# Patient Record
Sex: FEMALE | Race: WHITE | Employment: FULL TIME | URBAN - NONMETROPOLITAN AREA
[De-identification: names, ages, dates, MRNs, and addresses within clinical notes are randomized per-mention and may not be internally consistent; named-entity substitution may affect disease eponyms.]

---

## 2017-11-05 ENCOUNTER — HOSPITAL ENCOUNTER (OUTPATIENT)
Age: 47
Setting detail: OBSERVATION
Discharge: HOME | End: 2017-11-06
Attending: EMERGENCY MEDICINE | Admitting: FAMILY MEDICINE
Payer: MEDICAID

## 2017-11-05 DIAGNOSIS — E03.9 ACQUIRED HYPOTHYROIDISM: ICD-10-CM

## 2017-11-05 DIAGNOSIS — E86.0 DEHYDRATION: ICD-10-CM

## 2017-11-05 DIAGNOSIS — E03.2 HYPOTHYROIDISM DUE TO MEDICATION: Primary | ICD-10-CM

## 2017-11-05 DIAGNOSIS — R07.9 CHEST PAIN, UNSPECIFIED TYPE: ICD-10-CM

## 2017-11-05 PROCEDURE — 99284 EMERGENCY DEPT VISIT MOD MDM: CPT

## 2017-11-06 ENCOUNTER — APPOINTMENT (OUTPATIENT)
Dept: GENERAL RADIOLOGY | Age: 47
End: 2017-11-06

## 2017-11-06 VITALS
HEART RATE: 51 BPM | HEIGHT: 63 IN | RESPIRATION RATE: 16 BRPM | DIASTOLIC BLOOD PRESSURE: 63 MMHG | SYSTOLIC BLOOD PRESSURE: 111 MMHG | OXYGEN SATURATION: 97 % | WEIGHT: 182.98 LBS | TEMPERATURE: 98.7 F | BODY MASS INDEX: 32.42 KG/M2

## 2017-11-06 PROBLEM — R07.9 CHEST PAIN: Status: ACTIVE | Noted: 2017-11-06

## 2017-11-06 LAB
ABSOLUTE EOS #: 0.2 K/UL (ref 0–0.4)
ABSOLUTE IMMATURE GRANULOCYTE: ABNORMAL K/UL (ref 0–0.3)
ABSOLUTE LYMPH #: 2 K/UL (ref 1–4.8)
ABSOLUTE MONO #: 0.7 K/UL (ref 0.1–1.2)
ANION GAP SERPL CALCULATED.3IONS-SCNC: 12 MMOL/L (ref 9–17)
ANION GAP SERPL CALCULATED.3IONS-SCNC: 14 MMOL/L (ref 9–17)
BASOPHILS # BLD: 1 % (ref 0–1)
BASOPHILS ABSOLUTE: 0.1 K/UL (ref 0–0.2)
BUN BLDV-MCNC: 12 MG/DL (ref 6–20)
BUN BLDV-MCNC: 13 MG/DL (ref 6–20)
BUN/CREAT BLD: 12 (ref 9–20)
BUN/CREAT BLD: 13 (ref 9–20)
CALCIUM SERPL-MCNC: 8.8 MG/DL (ref 8.6–10.4)
CALCIUM SERPL-MCNC: 9.5 MG/DL (ref 8.6–10.4)
CHLORIDE BLD-SCNC: 105 MMOL/L (ref 98–107)
CHLORIDE BLD-SCNC: 98 MMOL/L (ref 98–107)
CO2: 23 MMOL/L (ref 20–31)
CO2: 24 MMOL/L (ref 20–31)
CREAT SERPL-MCNC: 0.97 MG/DL (ref 0.5–0.9)
CREAT SERPL-MCNC: 1.03 MG/DL (ref 0.5–0.9)
DIFFERENTIAL TYPE: ABNORMAL
EKG ATRIAL RATE: 52 BPM
EKG ATRIAL RATE: 56 BPM
EKG P AXIS: 43 DEGREES
EKG P AXIS: 54 DEGREES
EKG P-R INTERVAL: 180 MS
EKG P-R INTERVAL: 182 MS
EKG Q-T INTERVAL: 392 MS
EKG Q-T INTERVAL: 432 MS
EKG QRS DURATION: 82 MS
EKG QRS DURATION: 86 MS
EKG QTC CALCULATION (BAZETT): 378 MS
EKG QTC CALCULATION (BAZETT): 401 MS
EKG R AXIS: 18 DEGREES
EKG R AXIS: 18 DEGREES
EKG T AXIS: -76 DEGREES
EKG T AXIS: 40 DEGREES
EKG VENTRICULAR RATE: 52 BPM
EKG VENTRICULAR RATE: 56 BPM
EOSINOPHILS RELATIVE PERCENT: 3 % (ref 1–7)
GFR AFRICAN AMERICAN: >60 ML/MIN
GFR AFRICAN AMERICAN: >60 ML/MIN
GFR NON-AFRICAN AMERICAN: 57 ML/MIN
GFR NON-AFRICAN AMERICAN: >60 ML/MIN
GFR SERPL CREATININE-BSD FRML MDRD: ABNORMAL ML/MIN/{1.73_M2}
GLUCOSE BLD-MCNC: 114 MG/DL (ref 70–99)
GLUCOSE BLD-MCNC: 85 MG/DL (ref 70–99)
HCT VFR BLD CALC: 35 % (ref 36–46)
HCT VFR BLD CALC: 36.7 % (ref 36–46)
HEMOGLOBIN: 11.8 G/DL (ref 12–16)
HEMOGLOBIN: 12.5 G/DL (ref 12–16)
IMMATURE GRANULOCYTES: ABNORMAL %
LYMPHOCYTES # BLD: 24 % (ref 16–46)
MAGNESIUM: 2.1 MG/DL (ref 1.6–2.6)
MCH RBC QN AUTO: 30.1 PG (ref 26–34)
MCH RBC QN AUTO: 30.6 PG (ref 26–34)
MCHC RBC AUTO-ENTMCNC: 33.6 G/DL (ref 31–37)
MCHC RBC AUTO-ENTMCNC: 34.1 G/DL (ref 31–37)
MCV RBC AUTO: 89.6 FL (ref 80–100)
MCV RBC AUTO: 89.7 FL (ref 80–100)
MONOCYTES # BLD: 8 % (ref 4–11)
MYOGLOBIN: 165 NG/ML (ref 25–58)
PDW BLD-RTO: 15.2 % (ref 11–14.5)
PDW BLD-RTO: 15.5 % (ref 11–14.5)
PLATELET # BLD: 243 K/UL (ref 140–450)
PLATELET # BLD: 267 K/UL (ref 140–450)
PLATELET ESTIMATE: ABNORMAL
PMV BLD AUTO: 7.9 FL (ref 6–12)
PMV BLD AUTO: 8.1 FL (ref 6–12)
POTASSIUM SERPL-SCNC: 3.9 MMOL/L (ref 3.7–5.3)
POTASSIUM SERPL-SCNC: 4.1 MMOL/L (ref 3.7–5.3)
RBC # BLD: 3.91 M/UL (ref 4–5.2)
RBC # BLD: 4.09 M/UL (ref 4–5.2)
RBC # BLD: ABNORMAL 10*6/UL
SEG NEUTROPHILS: 64 % (ref 43–77)
SEGMENTED NEUTROPHILS ABSOLUTE COUNT: 5.1 K/UL (ref 1.8–7.7)
SODIUM BLD-SCNC: 136 MMOL/L (ref 135–144)
SODIUM BLD-SCNC: 140 MMOL/L (ref 135–144)
THYROXINE, FREE: 0.11 NG/DL (ref 0.93–1.7)
TROPONIN INTERP: NORMAL
TROPONIN T: <0.03 NG/ML
TSH SERPL DL<=0.05 MIU/L-ACNC: 132.3 MIU/L (ref 0.3–5)
WBC # BLD: 8 K/UL (ref 3.5–11)
WBC # BLD: 8.3 K/UL (ref 3.5–11)
WBC # BLD: ABNORMAL 10*3/UL

## 2017-11-06 PROCEDURE — 99238 HOSP IP/OBS DSCHRG MGMT 30/<: CPT | Performed by: INTERNAL MEDICINE

## 2017-11-06 PROCEDURE — G0378 HOSPITAL OBSERVATION PER HR: HCPCS

## 2017-11-06 PROCEDURE — 6370000000 HC RX 637 (ALT 250 FOR IP): Performed by: EMERGENCY MEDICINE

## 2017-11-06 PROCEDURE — 84439 ASSAY OF FREE THYROXINE: CPT

## 2017-11-06 PROCEDURE — 83735 ASSAY OF MAGNESIUM: CPT

## 2017-11-06 PROCEDURE — 99406 BEHAV CHNG SMOKING 3-10 MIN: CPT

## 2017-11-06 PROCEDURE — 80048 BASIC METABOLIC PNL TOTAL CA: CPT

## 2017-11-06 PROCEDURE — 90686 IIV4 VACC NO PRSV 0.5 ML IM: CPT | Performed by: PHYSICIAN ASSISTANT

## 2017-11-06 PROCEDURE — 85025 COMPLETE CBC W/AUTO DIFF WBC: CPT

## 2017-11-06 PROCEDURE — 2580000003 HC RX 258: Performed by: EMERGENCY MEDICINE

## 2017-11-06 PROCEDURE — 6370000000 HC RX 637 (ALT 250 FOR IP): Performed by: PHYSICIAN ASSISTANT

## 2017-11-06 PROCEDURE — 36415 COLL VENOUS BLD VENIPUNCTURE: CPT

## 2017-11-06 PROCEDURE — 6360000002 HC RX W HCPCS: Performed by: PHYSICIAN ASSISTANT

## 2017-11-06 PROCEDURE — 83874 ASSAY OF MYOGLOBIN: CPT

## 2017-11-06 PROCEDURE — 84443 ASSAY THYROID STIM HORMONE: CPT

## 2017-11-06 PROCEDURE — 99244 OFF/OP CNSLTJ NEW/EST MOD 40: CPT | Performed by: INTERNAL MEDICINE

## 2017-11-06 PROCEDURE — 93005 ELECTROCARDIOGRAM TRACING: CPT

## 2017-11-06 PROCEDURE — 96360 HYDRATION IV INFUSION INIT: CPT

## 2017-11-06 PROCEDURE — 85027 COMPLETE CBC AUTOMATED: CPT

## 2017-11-06 PROCEDURE — 2580000003 HC RX 258: Performed by: PHYSICIAN ASSISTANT

## 2017-11-06 PROCEDURE — 93306 TTE W/DOPPLER COMPLETE: CPT

## 2017-11-06 PROCEDURE — G0008 ADMIN INFLUENZA VIRUS VAC: HCPCS | Performed by: PHYSICIAN ASSISTANT

## 2017-11-06 PROCEDURE — 94760 N-INVAS EAR/PLS OXIMETRY 1: CPT

## 2017-11-06 PROCEDURE — 84484 ASSAY OF TROPONIN QUANT: CPT

## 2017-11-06 PROCEDURE — 94640 AIRWAY INHALATION TREATMENT: CPT

## 2017-11-06 PROCEDURE — 96372 THER/PROPH/DIAG INJ SC/IM: CPT

## 2017-11-06 PROCEDURE — 71020 XR CHEST STANDARD TWO VW: CPT

## 2017-11-06 RX ORDER — FAMOTIDINE 20 MG/1
20 TABLET, FILM COATED ORAL 2 TIMES DAILY
Status: DISCONTINUED | OUTPATIENT
Start: 2017-11-06 | End: 2017-11-06 | Stop reason: HOSPADM

## 2017-11-06 RX ORDER — LEVOTHYROXINE SODIUM 300 UG/1
TABLET ORAL
Qty: 30 TABLET | Refills: 0 | Status: SHIPPED | OUTPATIENT
Start: 2017-11-06 | End: 2018-01-05 | Stop reason: SDUPTHER

## 2017-11-06 RX ORDER — SODIUM CHLORIDE 0.9 % (FLUSH) 0.9 %
10 SYRINGE (ML) INJECTION PRN
Status: DISCONTINUED | OUTPATIENT
Start: 2017-11-06 | End: 2017-11-06 | Stop reason: HOSPADM

## 2017-11-06 RX ORDER — SODIUM CHLORIDE 0.9 % (FLUSH) 0.9 %
10 SYRINGE (ML) INJECTION EVERY 12 HOURS SCHEDULED
Status: DISCONTINUED | OUTPATIENT
Start: 2017-11-06 | End: 2017-11-06 | Stop reason: HOSPADM

## 2017-11-06 RX ORDER — CROMOLYN SODIUM 40 MG/ML
1 SOLUTION/ DROPS OPHTHALMIC 4 TIMES DAILY
Status: DISCONTINUED | OUTPATIENT
Start: 2017-11-06 | End: 2017-11-06 | Stop reason: HOSPADM

## 2017-11-06 RX ORDER — ASPIRIN 81 MG/1
81 TABLET, CHEWABLE ORAL DAILY
Qty: 30 TABLET | Refills: 0 | COMMUNITY
Start: 2017-11-06

## 2017-11-06 RX ORDER — 0.9 % SODIUM CHLORIDE 0.9 %
1000 INTRAVENOUS SOLUTION INTRAVENOUS ONCE
Status: COMPLETED | OUTPATIENT
Start: 2017-11-06 | End: 2017-11-06

## 2017-11-06 RX ORDER — ACETAMINOPHEN 325 MG/1
650 TABLET ORAL EVERY 4 HOURS PRN
Status: DISCONTINUED | OUTPATIENT
Start: 2017-11-06 | End: 2017-11-06 | Stop reason: HOSPADM

## 2017-11-06 RX ORDER — FERROUS SULFATE 325(65) MG
325 TABLET ORAL
Status: DISCONTINUED | OUTPATIENT
Start: 2017-11-06 | End: 2017-11-06 | Stop reason: HOSPADM

## 2017-11-06 RX ORDER — NICOTINE 21 MG/24HR
1 PATCH, TRANSDERMAL 24 HOURS TRANSDERMAL EVERY 24 HOURS
Qty: 30 PATCH | Refills: 0 | COMMUNITY
Start: 2017-11-06 | End: 2018-11-06

## 2017-11-06 RX ORDER — MONTELUKAST SODIUM 10 MG/1
10 TABLET ORAL DAILY
Status: DISCONTINUED | OUTPATIENT
Start: 2017-11-06 | End: 2017-11-06 | Stop reason: HOSPADM

## 2017-11-06 RX ORDER — CETIRIZINE HYDROCHLORIDE 5 MG/1
10 TABLET ORAL DAILY
Status: DISCONTINUED | OUTPATIENT
Start: 2017-11-06 | End: 2017-11-06 | Stop reason: HOSPADM

## 2017-11-06 RX ORDER — ONDANSETRON 2 MG/ML
4 INJECTION INTRAMUSCULAR; INTRAVENOUS EVERY 6 HOURS PRN
Status: DISCONTINUED | OUTPATIENT
Start: 2017-11-06 | End: 2017-11-06 | Stop reason: HOSPADM

## 2017-11-06 RX ORDER — SODIUM CHLORIDE 9 MG/ML
INJECTION, SOLUTION INTRAVENOUS CONTINUOUS
Status: DISCONTINUED | OUTPATIENT
Start: 2017-11-06 | End: 2017-11-06

## 2017-11-06 RX ORDER — ATORVASTATIN CALCIUM 40 MG/1
40 TABLET, FILM COATED ORAL DAILY
Status: DISCONTINUED | OUTPATIENT
Start: 2017-11-06 | End: 2017-11-06 | Stop reason: HOSPADM

## 2017-11-06 RX ORDER — LEVOTHYROXINE SODIUM 0.03 MG/1
TABLET ORAL
Qty: 30 TABLET | Refills: 0 | Status: SHIPPED | OUTPATIENT
Start: 2017-11-06 | End: 2018-01-05 | Stop reason: SDUPTHER

## 2017-11-06 RX ORDER — AMITRIPTYLINE HYDROCHLORIDE 25 MG/1
50 TABLET, FILM COATED ORAL NIGHTLY
Status: DISCONTINUED | OUTPATIENT
Start: 2017-11-06 | End: 2017-11-06 | Stop reason: HOSPADM

## 2017-11-06 RX ADMIN — FAMOTIDINE 20 MG: 20 TABLET ORAL at 08:24

## 2017-11-06 RX ADMIN — INFLUENZA A VIRUS A/MICHIGAN/45/2015 X-275 (H1N1) ANTIGEN (FORMALDEHYDE INACTIVATED), INFLUENZA A VIRUS A/HONG KONG/4801/2014 X-263B (H3N2) ANTIGEN (FORMALDEHYDE INACTIVATED), INFLUENZA B VIRUS B/PHUKET/3073/2013 ANTIGEN (FORMALDEHYDE INACTIVATED), AND INFLUENZA B VIRUS B/BRISBANE/60/2008 ANTIGEN (FORMALDEHYDE INACTIVATED) 0.5 ML: 15; 15; 15; 15 INJECTION, SUSPENSION INTRAMUSCULAR at 12:52

## 2017-11-06 RX ADMIN — SERTRALINE HYDROCHLORIDE 50 MG: 50 TABLET ORAL at 08:24

## 2017-11-06 RX ADMIN — LEVOTHYROXINE SODIUM 325 MCG: 25 TABLET ORAL at 01:22

## 2017-11-06 RX ADMIN — SODIUM CHLORIDE 1000 ML: 9 INJECTION, SOLUTION INTRAVENOUS at 00:25

## 2017-11-06 RX ADMIN — SODIUM CHLORIDE: 9 INJECTION, SOLUTION INTRAVENOUS at 02:16

## 2017-11-06 RX ADMIN — ATORVASTATIN CALCIUM 40 MG: 40 TABLET, FILM COATED ORAL at 08:24

## 2017-11-06 RX ADMIN — ACETAMINOPHEN 650 MG: 325 TABLET ORAL at 08:24

## 2017-11-06 RX ADMIN — ENOXAPARIN SODIUM 40 MG: 40 INJECTION SUBCUTANEOUS at 08:25

## 2017-11-06 RX ADMIN — FERROUS SULFATE TAB 325 MG (65 MG ELEMENTAL FE) 325 MG: 325 (65 FE) TAB at 12:54

## 2017-11-06 RX ADMIN — MONTELUKAST SODIUM 10 MG: 10 TABLET, FILM COATED ORAL at 08:24

## 2017-11-06 RX ADMIN — BECLOMETHASONE DIPROPIONATE 1 PUFF: 40 AEROSOL, METERED RESPIRATORY (INHALATION) at 08:03

## 2017-11-06 RX ADMIN — FERROUS SULFATE TAB 325 MG (65 MG ELEMENTAL FE) 325 MG: 325 (65 FE) TAB at 08:24

## 2017-11-06 RX ADMIN — CETIRIZINE HYDROCHLORIDE 10 MG: 5 TABLET, FILM COATED ORAL at 08:23

## 2017-11-06 RX ADMIN — ASPIRIN 325 MG: 325 TABLET, DELAYED RELEASE ORAL at 05:40

## 2017-11-06 RX ADMIN — LEVOTHYROXINE SODIUM 325 MCG: 25 TABLET ORAL at 08:24

## 2017-11-06 ASSESSMENT — ENCOUNTER SYMPTOMS
ABDOMINAL DISTENTION: 0
EYE DISCHARGE: 0
CHEST TIGHTNESS: 0
FACIAL SWELLING: 0
ABDOMINAL PAIN: 0
EYE REDNESS: 0
SHORTNESS OF BREATH: 0

## 2017-11-06 ASSESSMENT — PAIN SCALES - GENERAL
PAINLEVEL_OUTOF10: 3
PAINLEVEL_OUTOF10: 2

## 2017-11-06 NOTE — CONSULTS
[facility] Cardiology    Consult                        Today's Date: 11/6/2017  Patient Name: Monet Jimenez  Date of admission: 11/5/2017 11:33 PM  Patient's age: 52 y.o., 1970  Admission Dx: Chest pain [R07.9]    Reason for Consult:  Cardiac evaluation    Requesting Physician: Sariah Chavis MD    CHIEF COMPLAINT:  Fatigue, intermittent chest pain    History Obtained From:  Patient     HISTORY OF PRESENT ILLNESS:      The patient is a 52 y.o.  female who is admitted to the hospital for chest pain along with fatigue and not feeling well. She report pressure/burning like sensation across her chest yesterday unrelated to exertion. She reports having mild chest discomfort once a month. She denies any dyspnea on exertion, PND, orthopnea or edema. She has history of hypothyroidism, hyperlipidemia and GERD and stopped taking her thyroid medication. Her TSH upon presentation was 132. She denies any family history of CAD. She is a chronic smoker. Trop x 2 negative    Past Medical History:   has a past medical history of Anemia; Asthma; Bipolar 1 disorder ([address] Daytime somnolence; Depression; Dysfunctional uterine bleeding; Family history of ovarian cancer; GERD (gastroesophageal reflux disease); Hemorrhoid; Hiatal hernia; Hyperlipidemia; Hypothyroidism; Menorrhagia; Migraines; Myopia with astigmatism; Seasonal allergies; and Tobacco abuse. Past Surgical History:   has a past surgical history that includes Cholecystectomy, laparoscopic; Tubal ligation; Dilation and curettage of uterus; Colonoscopy (05/2013); Upper gastrointestinal endoscopy (05/2013); and Endometrial ablation (2013). Home Medications:    Prior to Admission medications    Medication Sig Start Date End Date Taking?  Authorizing Provider   levothyroxine (SYNTHROID) 300 MCG tablet Take one table by mouth once daily with 25mcg tablet for a total dose of 325mcg daily 10/18/16  Yes Erwin Justice MD   levothyroxine daily. 6/14/14   Loren Hawkins DO       Allergies:  Review of patient's allergies indicates no known allergies. Social History:   reports that she has been smoking Cigarettes. She has a 5.00 pack-year smoking history. She has never used smokeless tobacco. She reports that she does not drink alcohol or use drugs. Family History: family history includes Alcohol Abuse in her father, paternal grandfather, and paternal grandmother; Alzheimer's Disease in her maternal grandmother; Cancer in her paternal grandmother; Depression in her paternal grandmother; Evan Escobar in her father; Ovarian Cancer in her mother; Thyroid Disease in her mother. No h/o sudden cardiac death. No for premature CAD    REVIEW OF SYSTEMS:    · Constitutional: there has been no unanticipated weight loss. There's been No change in energy level, No change in activity level. · Eyes: No visual changes or diplopia. No scleral icterus. · ENT: No Headaches, hearing loss or vertigo. No mouth sores or sore throat. · Cardiovascular: see above  · Respiratory: see above  · Gastrointestinal: No abdominal pain, appetite loss, blood in stools. · Genitourinary: No dysuria, trouble voiding, or hematuria. · Musculoskeletal:  No gait disturbance, No weakness or joint complaints. · Integumentary: No rash or pruritis. · Neurological: No headache or diplopia. No tingling  · Psychiatric: No anxiety, or depression. · Endocrine: No temperature intolerance. · Hematologic/Lymphatic: No abnormal bruising or bleeding. · Allergic/Immunologic: No nasal congestion or hives. PHYSICAL EXAM:      BP (!) 115/58   Pulse 58   Temp 98.5 °F (36.9 °C)   Resp 18   Ht 5' 3\" (1.6 m)   Wt 182 lb 15.7 oz (83 kg)   SpO2 97%   BMI 32.41 kg/m²    Constitutional and General Appearance: alert, cooperative, no distress and appears stated age  HEENT: PERRL, no cervical lymphadenopathy. No masses palpable.  Normal oral mucosa  Respiratory:  · Normal excursion and

## 2017-11-06 NOTE — FLOWSHEET NOTE
11/06/17 1142   Encounter Summary   Services provided to: Patient and family together   Referral/Consult From: Rounding   Complexity of Encounter Low   Routine   Type Initial   Assessment Approachable   Intervention Sustaining presence/ Ministry of presence   Outcome Receptive

## 2017-11-06 NOTE — PROGRESS NOTES
23 11/06/2017    BUN 12 11/06/2017    LABALBU 4.5 03/24/2015    CREATININE 0.97 11/06/2017    CALCIUM 8.8 11/06/2017    GFRAA >60 11/06/2017    LABGLOM >60 11/06/2017    GLUCOSE 85 11/06/2017     Last 3 Troponin:  No results found for: TROPONINI        Physical Exam:  Vitals: BP (!) 115/58   Pulse 58   Temp 98.5 °F (36.9 °C) (Tympanic)   Resp 18   Ht 5' 3\" (1.6 m)   Wt 182 lb 15.7 oz (83 kg)   SpO2 97%   BMI 32.41 kg/m²   24 hour intake/output:  Intake/Output Summary (Last 24 hours) at 11/06/17 1007  Last data filed at 11/06/17 0626   Gross per 24 hour   Intake              917 ml   Output                0 ml   Net              917 ml     Last 3 weights: Wt Readings from Last 3 Encounters:   11/06/17 182 lb 15.7 oz (83 kg)   11/02/16 173 lb 12.8 oz (78.8 kg)   10/18/16 179 lb (81.2 kg)     HEENT: Normocephalic and Atraumatic  Neck: Supple, No Masses, Tenderness, Nodularity and No Lymphadenopathy  Chest/Lungs: Clear to Auscultation without Rales, Rhonchi, or Wheezes  Cardiac: Regular Rate and Rhythm--bradycardia  GI/Abdomen: Bowel Sounds Present, Soft, Non-tender, without Guarding or Rebound Tenderness, No Masses and No Tenderness  : Not examined  EXT/Skin: multiple tattoos----, No Edema, No Cyanosis and No Clubbing  Neuro: Alert and Oriented and No Localizing Signs/Symptoms      Assessment:    Principal Problem:    Chest pain  Active Problems:    Hypothyroidism  LINDA SALAS md      dc   FP  47  WF  [F[facility] [place]; [place]    Chest pain11. 5.2017        MI ruled out---11.6.2017       CXR11. 5.2017no acute cardiopulmonary diseasesmall        2D ECHO---11.6.2017NLVSFNRVSFall valve structures                          appear normaltrivial MR-TRPIRVSP ~ 22 mm Hg                         LVEF ~ 70%       EKG11. 6.2017---sinus bradycardia52non-significant Q II-III-AVF  Hypothyroidism----severe----TSH = 132       Non-compliance---Synthroid not

## 2017-11-06 NOTE — H&P
25mcg tablet for a total dose of 325mcg daily  levothyroxine (SYNTHROID) 25 MCG tablet, TAKE ONE TABLET BY MOUTH ONCE DAILY IN THE MORNING  sertraline (ZOLOFT) 50 MG tablet, Take 1 tablet by mouth daily  cromolyn (OPTICROM) 4 % ophthalmic solution, INSTILL ONE DROP INTO EACH EYE 4 TIMES DAILY  nicotine (NICODERM CQ) 14 MG/24HR, Place 1 patch onto the skin every 24 hours  nicotine (NICODERM CQ) 7 MG/24HR, Place 1 patch onto the skin every 24 hours Use after finishing the 14mg patches  cetirizine (ZYRTEC ALLERGY) 10 MG tablet, Take 1 tablet by mouth daily  ferrous sulfate (FE TABS) 325 (65 FE) MG EC tablet, Take 1 tablet by mouth 3 times daily (with meals)  famotidine (PEPCID) 20 MG tablet, TAKE ONE TABLET BY MOUTH TWICE DAILY  montelukast (SINGULAIR) 10 MG tablet, TAKE ONE TABLET BY MOUTH ONCE DAILY  hydrOXYzine (VISTARIL) 50 MG capsule, TAKE ONE CAPSULE BY MOUTH THREE TIMES DAILY AS NEEDED FOR ITCHING  atorvastatin (LIPITOR) 40 MG tablet, TAKE ONE TABLET BY MOUTH ONCE DAILY  amitriptyline (ELAVIL) 50 MG tablet, TAKE ONE TABLET BY MOUTH ONCE DAILY AT BEDTIME  fluticasone (FLOVENT HFA) 110 MCG/ACT inhaler, Inhale 2 puffs into the lungs 2 times daily  cyclobenzaprine (FLEXERIL) 10 MG tablet, TAKE ONE TABLET BY MOUTH THREE TIMES DAILY AS NEEDED FOR MUSCLE SPASM  naproxen sodium (ANAPROX) 550 MG tablet,     cyclobenzaprine (FLEXERIL) 10 MG tablet, Take 1 tablet by mouth every 8 hours as needed for Muscle spasms for up to 10 days. fluticasone (FLONASE) 50 MCG/ACT nasal spray, 2 sprays by Nasal route daily. Allergies:  Review of patient's allergies indicates no known allergies. Social History:   Social History     Social History    Marital status:      Spouse name: N/A    Number of children: N/A    Years of education: N/A     Occupational History    Not on file.      Social History Main Topics    Smoking status: Current Some Day Smoker     Packs/day: 0.25     Years: 20.00     Types: Cigarettes    tenderness  LUNGS:  No increased work of breathing, good air exchange, clear to auscultation bilaterally, no crackles or wheezing  CARDIOVASCULAR:  Normal apical impulse, regular rate and rhythm, normal S1 and S2, no S3 or S4, and no murmur noted  ABDOMEN:  No scars, normal bowel sounds, soft, non-distended, non-tender, no masses palpated, no hepatosplenomegally  MUSCULOSKELETAL:  There is no redness, warmth, or swelling of the joints. Full range of motion noted. Motor strength is 5 out of 5 all extremities bilaterally. Tone is normal.  NEUROLOGIC:  Awake, alert, oriented to name, place and time. Cranial nerves II-XII are grossly intact. Motor is 5 out of 5 bilaterally. Cerebellar finger to nose, heel to shin intact. Sensory is intact.   Babinski down going, Romberg negative, and gait is normal.  SKIN:  no bruising or bleeding, normal skin color, texture, turgor and no redness, warmth, or swelling    DATA:  CBC with Differential:    Lab Results   Component Value Date    WBC 8.0 11/06/2017    RBC 4.09 11/06/2017    HGB 12.5 11/06/2017    HCT 36.7 11/06/2017     11/06/2017    MCV 89.7 11/06/2017    MCH 30.6 11/06/2017    MCHC 34.1 11/06/2017    RDW 15.5 11/06/2017    LYMPHOPCT 24 11/06/2017    MONOPCT 8 11/06/2017    BASOPCT 1 11/06/2017    MONOSABS 0.70 11/06/2017    LYMPHSABS 2.00 11/06/2017    EOSABS 0.20 11/06/2017    BASOSABS 0.10 11/06/2017    DIFFTYPE NOT REPORTED 11/06/2017     BMP:    Lab Results   Component Value Date     11/06/2017    K 3.9 11/06/2017    CL 98 11/06/2017    CO2 24 11/06/2017    BUN 13 11/06/2017    LABALBU 4.5 03/24/2015    CREATININE 1.03 11/06/2017    CALCIUM 9.5 11/06/2017    GFRAA >60 11/06/2017    LABGLOM 57 11/06/2017    GLUCOSE 114 11/06/2017     Troponin:  No results found for: TROPONINI  TSH:    Lab Results   Component Value Date    .30 11/06/2017     ASSESSMENT AND PLAN:      Patient Active Hospital Problem List:  Chest pain - ACS rule out, cardiology

## 2017-11-06 NOTE — ED PROVIDER NOTES
MG EC TABLET    Take 1 tablet by mouth 3 times daily (with meals)    FLUTICASONE (FLONASE) 50 MCG/ACT NASAL SPRAY    2 sprays by Nasal route daily. FLUTICASONE (FLOVENT HFA) 110 MCG/ACT INHALER    Inhale 2 puffs into the lungs 2 times daily    HYDROXYZINE (VISTARIL) 50 MG CAPSULE    TAKE ONE CAPSULE BY MOUTH THREE TIMES DAILY AS NEEDED FOR ITCHING    LEVOTHYROXINE (SYNTHROID) 25 MCG TABLET    TAKE ONE TABLET BY MOUTH ONCE DAILY IN THE MORNING    LEVOTHYROXINE (SYNTHROID) 300 MCG TABLET    Take one table by mouth once daily with 25mcg tablet for a total dose of 325mcg daily    MONTELUKAST (SINGULAIR) 10 MG TABLET    TAKE ONE TABLET BY MOUTH ONCE DAILY    NAPROXEN SODIUM (ANAPROX) 550 MG TABLET        NICOTINE (NICODERM CQ) 14 MG/24HR    Place 1 patch onto the skin every 24 hours    NICOTINE (NICODERM CQ) 7 MG/24HR    Place 1 patch onto the skin every 24 hours Use after finishing the 14mg patches    SERTRALINE (ZOLOFT) 50 MG TABLET    Take 1 tablet by mouth daily       ALLERGIES     Review of patient's allergies indicates no known allergies. FAMILY HISTORY       Family History   Problem Relation Age of Onset    Ovarian Cancer Mother     Thyroid Disease Mother      hypothyroidism    Lung Cancer Father      with brain mets    Alcohol Abuse Father     Depression Paternal Grandmother     Cancer Paternal Grandmother     Alcohol Abuse Paternal Grandmother     Alzheimer's Disease Maternal Grandmother     Alcohol Abuse Paternal Grandfather     Glaucoma Neg Hx           SOCIAL HISTORY       Social History     Social History    Marital status:       Spouse name: N/A    Number of children: N/A    Years of education: N/A     Social History Main Topics    Smoking status: Current Some Day Smoker     Packs/day: 0.25     Years: 20.00     Types: Cigarettes    Smokeless tobacco: Never Used    Alcohol use No    Drug use: No    Sexual activity: Yes     Partners: Male     Other Topics Concern    None following:        Result Value    RDW 15.5 (*)     All other components within normal limits   BASIC METABOLIC PANEL - Abnormal; Notable for the following:     Glucose 114 (*)     CREATININE 1.03 (*)     GFR Non- 57 (*)     All other components within normal limits   TSH WITH REFLEX - Abnormal; Notable for the following:     .30 (*)     All other components within normal limits   MYOGLOBIN, SERUM - Abnormal; Notable for the following:     Myoglobin 165 (*)     All other components within normal limits   TROPONIN   MAGNESIUM   T4, FREE       All other labs were within normal range or not returned as of this dictation. EMERGENCY DEPARTMENT COURSE and DIFFERENTIAL DIAGNOSIS/MDM:   Vitals:    Vitals:    11/05/17 2337 11/06/17 0025 11/06/17 0055   BP: (!) 140/85 130/65 131/81   Pulse: 67 66 54   Resp: 14 14 13   Temp: 98.2 °F (36.8 °C)     TempSrc: Tympanic     SpO2: 100% 98% 98%   Weight: 178 lb (80.7 kg)     Height: 5' 3\" (1.6 m)         Lab work, chest xray, EKG, one set of Shweta (pt has had discomfort for over a week), thyroid labs    RE-EVALUATION:    The patient does have a elevated TSH. The patient does not have any signs of myxedema coma. she appears to be dehydrated by her lab results. The patient did get a liter fluid while in the ER. The patient would prefer to be admitted. The patient feels exhausted and she said her brain does feel foggy. I spoke with Naye for admission. Repeat cardiac enzymes, fluids, repeat labs. The patient will be given a dose of synthroid in the ER. CONSULTS:  None    PROCEDURES:  None    FINAL IMPRESSION      1. Hypothyroidism due to medication    2. Dehydration    3. Chest pain, unspecified type          DISPOSITION/PLAN   DISPOSITION Decision to Admit    CONDITION ON DISPOSITION:  stable    PATIENT REFERRED TO:  No follow-up provider specified.     DISCHARGE MEDICATIONS:  New Prescriptions    No medications on file       (Please note that

## 2017-11-07 ENCOUNTER — TELEPHONE (OUTPATIENT)
Dept: FAMILY MEDICINE CLINIC | Age: 47
End: 2017-11-07

## 2017-11-07 NOTE — PROCEDURES
[address]                [address] [place] [address]                                ECHOCARDIOGRAM    PATIENT NAME: Brenda Anaya                 :         1970  MED REC NO:   [id number]                             ROOM:       0203  ACCOUNT NO:   [de-identified]                           ADMIT DATE:  2017  PROVIDER:     Sydney Franco    DATE OF PROCEDURE:  2017    TRANSTHORACIC ECHOCARDIOGRAM    ORDERING PROVIDER:  Wayne Henry MD    PRIMARY CARE PROVIDER:  Marcelo Carballo MD    CLINICAL DIAGNOSIS:  Chest pain    M-MODE/SECTOR MEASUREMENTS:   (*Measurement made in subxiphoid view)    1. LVEDD (3.7-5.6cm<3.2cm/m2)     5.0  2. LVESD (2.2-4.0cm)    3.4  3. MAS (24-42%)    32%  4. MESS (<9cm)  5. LVIVS thickness (.6-1.2cm)     1.0  6. LVPW thickness (.5-1.0cm) 1.0  7. Estimated mitral valve are by Planimetry  8. Blood pressure  9. LA (1.9-4.0cm<2.2cm/m2)   3.6  10.  RVIDD (.7-2.6cm<1.4cm/m2)  11.  RVW (.3-.9cm)  12.  Pulmonary artery  13. LVOT diameter  2.0  14. Ao Root (2.0-3.7<2.2cm/m2)    2.6  15.  Heart rate  16. LV Mass - Diastolic (644PM)  17. LV Mass - Systolic (287LC)  18. Estimated Ejection Fraction (Estrada's) 70%  19. IVC Collapse   Yes  20. Diameter (0.9-1.5 cm)  21. LA Volume Index  22. LA Volume  23. RVSP    SUMMARY:  1. Left ventricular dimensions are within normal limits. LV ejection  fraction is 70%. 2.  Normal right ventricular size and systolic function noted. Trivial mitral and tricuspid regurgitation noted with right  ventricular systolic pressure of 22 mmHg. 3.  Trivial pulmonic valve insufficiency. 4.  No pericardial effusion.         MAIRA MOSHER    D: 2017 13:35:56       T: 2017 14:36:23     [email or web address]  Job#: [id number]     Doc#: [id number]

## 2017-11-07 NOTE — TELEPHONE ENCOUNTER
[address] [facility] Discharge Phone [phone] [facility] Transitions Initial Follow Up Call    Call within 2 business days of discharge: Yes 1st attempt 11-7-17 at 5pm  2nd attempt 11-8-17 10:30am    1. Hospital Information    Discharged from: [facility]    Discharge to home  Discharge Date: 11-6-17   Admission Date: 11-5-17    Non-face-to-face services provided:  Scheduled appointment with PCP-Dr NOBLE  [facility] records: obtained and reviewed    Was instructed to follow up in: 1 week    Hospital Diagnosis: [id number] Consultants:  Cardiology,     Initial contact Date: 11/7/2017  Type of Contact:  by phone      2. Assessment of Current Condition      Questions: How have you felt since discharge from the hospital:         Did you receive a discharge summary from the hospital?       Are you eating and drinking OK? Are there any new complaints of pain? If you have a wound - is the dressing clean, dry, and intact? Reinforce Education    Does the patient know -   1. What to do if her symptoms worsen? 2. For what symptoms she should call the doctor? 3. What symptoms she should get help with right away? 4. Does she know how to contact her physician? Are there any questions about the patients medications? Does the patient have a list of all the medications that were prescribed to be taken after discharge? Does the patient have all the medications that were prescribed? Is the patient taking all the prescribed medications? Does the patient understand what all the medications are taken for?        ( Update medication list and refresh medication smartlinks below)        All Active Meds in Chart - (keep all current active meds, add hospital meds)  Current Outpatient Prescriptions   Medication Sig Dispense Refill    levothyroxine (SYNTHROID) 25 MCG tablet TAKE ONE TABLET BY MOUTH ONCE DAILY IN THE MORNING 30 tablet 0    levothyroxine (SYNTHROID) 300 MCG tablet Take one table by mouth once daily with 25mcg tablet for a total dose of 325mcg daily 30 tablet 0    aspirin (ASPIRIN CHILDRENS) 81 MG chewable tablet Take 1 tablet by mouth daily 30 tablet 0    nicotine (NICODERM CQ) 21 MG/24HR Place 1 patch onto the skin every 24 hours 30 patch 0    cromolyn (OPTICROM) 4 % ophthalmic solution INSTILL ONE DROP INTO EACH EYE 4 TIMES DAILY 1 Bottle 0    cetirizine (ZYRTEC ALLERGY) 10 MG tablet Take 1 tablet by mouth daily 30 tablet 0    ferrous sulfate (FE TABS) 325 (65 FE) MG EC tablet Take 1 tablet by mouth 3 times daily (with meals) 90 tablet 3    famotidine (PEPCID) 20 MG tablet TAKE ONE TABLET BY MOUTH TWICE DAILY 60 tablet 3    montelukast (SINGULAIR) 10 MG tablet TAKE ONE TABLET BY MOUTH ONCE DAILY 30 tablet 3    hydrOXYzine (VISTARIL) 50 MG capsule TAKE ONE CAPSULE BY MOUTH THREE TIMES DAILY AS NEEDED FOR ITCHING 90 capsule 3    atorvastatin (LIPITOR) 40 MG tablet TAKE ONE TABLET BY MOUTH ONCE DAILY 30 tablet 3    amitriptyline (ELAVIL) 50 MG tablet TAKE ONE TABLET BY MOUTH ONCE DAILY AT BEDTIME 30 tablet 3    fluticasone (FLOVENT HFA) 110 MCG/ACT inhaler Inhale 2 puffs into the lungs 2 times daily 1 Inhaler 3    sertraline (ZOLOFT) 50 MG tablet Take 1 tablet by mouth daily 30 tablet 3    cyclobenzaprine (FLEXERIL) 10 MG tablet TAKE ONE TABLET BY MOUTH THREE TIMES DAILY AS NEEDED FOR MUSCLE SPASM 60 tablet 0    fluticasone (FLONASE) 50 MCG/ACT nasal spray 2 sprays by Nasal route daily. 1 Bottle 11     No current facility-administered medications for this visit. Current Medications (record all meds as 'taken' or 'not taken' in home med activity)  No outpatient prescriptions have been marked as taking for the 11/7/17 encounter (Telephone) with Ash Hernandez MD.         Are there any questions about how the patient should take care of herself? Does the patient understand her diet regimen? Does the patient understand his or her activity level?     Does the patient understand how to care for her wound? Does the patient understand how to monitor her weight? Does the patient understand what to do if she becomes short of breath? Does the patient understand what to do if she has increased edema? Does the patient understand how to monitor Blood sugar? Is the patient having any trouble with ADL's? Home care services initiated:      Services provided:       Does that patient have equipment needs? Reinforce Follow-Up  - Does patient have an appointment with her PCP?    - Does patient have an appointment with her specialist physicians?          Follow up appointment date: (7 days for more severe illness, 14 days for others)  Future Appointments  Date Time Provider Michelle Davison   11/14/2017 11:00 AM Nabila Giles MD [facility]   11/20/2017 8:00 AM SCHEDULE, IV STARTS [facility] CARDIOLOGY MD STRESS None   [facility]2017 9[facility] [place]   11/20/2017 9:15 AM SCHEDULE, STRESS [facility] CARDIOLOGY MD STRESS None   11/20/2017 10:[facility] [place]       Other Interventions or Actions taken as result of  Assessment  -         Freda Pascual, FIDEL

## 2018-01-05 DIAGNOSIS — E03.9 ACQUIRED HYPOTHYROIDISM: ICD-10-CM

## 2018-01-05 RX ORDER — LEVOTHYROXINE SODIUM 0.03 MG/1
TABLET ORAL
Qty: 30 TABLET | Refills: 0 | Status: SHIPPED | OUTPATIENT
Start: 2018-01-05

## 2018-01-05 RX ORDER — LEVOTHYROXINE SODIUM 300 UG/1
TABLET ORAL
Qty: 30 TABLET | Refills: 0 | Status: SHIPPED | OUTPATIENT
Start: 2018-01-05

## 2019-06-11 ENCOUNTER — HOSPITAL ENCOUNTER (OUTPATIENT)
Age: 49
Setting detail: SPECIMEN
Discharge: HOME | End: 2019-06-11

## 2019-06-11 LAB
ABSOLUTE EOS #: 0.2 K/UL (ref 0–0.44)
ABSOLUTE IMMATURE GRANULOCYTE: 0.11 K/UL (ref 0–0.3)
ABSOLUTE LYMPH #: 1.42 K/UL (ref 1.1–3.7)
ABSOLUTE MONO #: 0.85 K/UL (ref 0.1–1.2)
ALBUMIN SERPL-MCNC: 4.4 G/DL (ref 3.5–5.2)
ALBUMIN/GLOBULIN RATIO: 1.7 (ref 1–2.5)
ALP BLD-CCNC: 92 U/L (ref 35–104)
ALT SERPL-CCNC: 9 U/L (ref 5–33)
ANION GAP SERPL CALCULATED.3IONS-SCNC: 12 MMOL/L (ref 9–17)
AST SERPL-CCNC: 10 U/L
BASOPHILS # BLD: 1 % (ref 0–2)
BASOPHILS ABSOLUTE: 0.07 K/UL (ref 0–0.2)
BILIRUB SERPL-MCNC: <0.1 MG/DL (ref 0.3–1.2)
BUN BLDV-MCNC: 10 MG/DL (ref 6–20)
BUN/CREAT BLD: ABNORMAL (ref 9–20)
CALCIUM SERPL-MCNC: 9.9 MG/DL (ref 8.6–10.4)
CHLORIDE BLD-SCNC: 103 MMOL/L (ref 98–107)
CHOLESTEROL/HDL RATIO: 4
CHOLESTEROL: 207 MG/DL
CO2: 26 MMOL/L (ref 20–31)
CREAT SERPL-MCNC: 0.96 MG/DL (ref 0.5–0.9)
DIFFERENTIAL TYPE: ABNORMAL
EOSINOPHILS RELATIVE PERCENT: 2 % (ref 1–4)
GFR AFRICAN AMERICAN: >60 ML/MIN
GFR NON-AFRICAN AMERICAN: >60 ML/MIN
GFR SERPL CREATININE-BSD FRML MDRD: ABNORMAL ML/MIN/{1.73_M2}
GFR SERPL CREATININE-BSD FRML MDRD: ABNORMAL ML/MIN/{1.73_M2}
GLUCOSE BLD-MCNC: 81 MG/DL (ref 70–99)
HCT VFR BLD CALC: 41 % (ref 36.3–47.1)
HDLC SERPL-MCNC: 52 MG/DL
HEMOGLOBIN: 12.1 G/DL (ref 11.9–15.1)
IMMATURE GRANULOCYTES: 1 %
LDL CHOLESTEROL: 128 MG/DL (ref 0–130)
LYMPHOCYTES # BLD: 12 % (ref 24–43)
MCH RBC QN AUTO: 29.5 PG (ref 25.2–33.5)
MCHC RBC AUTO-ENTMCNC: 29.5 G/DL (ref 28.4–34.8)
MCV RBC AUTO: 100 FL (ref 82.6–102.9)
MONOCYTES # BLD: 7 % (ref 3–12)
NRBC AUTOMATED: 0 PER 100 WBC
PDW BLD-RTO: 14.5 % (ref 11.8–14.4)
PLATELET # BLD: 342 K/UL (ref 138–453)
PLATELET ESTIMATE: ABNORMAL
PMV BLD AUTO: 11.9 FL (ref 8.1–13.5)
POTASSIUM SERPL-SCNC: 4.5 MMOL/L (ref 3.7–5.3)
RBC # BLD: 4.1 M/UL (ref 3.95–5.11)
RBC # BLD: ABNORMAL 10*6/UL
SEG NEUTROPHILS: 77 % (ref 36–65)
SEGMENTED NEUTROPHILS ABSOLUTE COUNT: 8.83 K/UL (ref 1.5–8.1)
SODIUM BLD-SCNC: 141 MMOL/L (ref 135–144)
TOTAL PROTEIN: 7 G/DL (ref 6.4–8.3)
TRIGL SERPL-MCNC: 135 MG/DL
TSH SERPL DL<=0.05 MIU/L-ACNC: 49.48 MIU/L (ref 0.3–5)
VLDLC SERPL CALC-MCNC: ABNORMAL MG/DL (ref 1–30)
WBC # BLD: 11.5 K/UL (ref 3.5–11.3)
WBC # BLD: ABNORMAL 10*3/UL

## 2019-06-12 LAB — THYROXINE, FREE: 0.49 NG/DL (ref 0.93–1.7)

## 2020-01-07 ENCOUNTER — HOSPITAL ENCOUNTER (OUTPATIENT)
Age: 50
Setting detail: SPECIMEN
Discharge: HOME | End: 2020-01-07

## 2020-01-07 LAB
-: NORMAL
AMORPHOUS: NORMAL
BACTERIA: NORMAL
BILIRUBIN URINE: NEGATIVE
CASTS UA: NORMAL /LPF (ref 0–8)
COLOR: YELLOW
COMMENT UA: ABNORMAL
CRYSTALS, UA: NORMAL /HPF
EPITHELIAL CELLS UA: NORMAL /HPF (ref 0–5)
GLUCOSE URINE: NEGATIVE
KETONES, URINE: NEGATIVE
LEUKOCYTE ESTERASE, URINE: ABNORMAL
MUCUS: NORMAL
NITRITE, URINE: NEGATIVE
OTHER OBSERVATIONS UA: NORMAL
PH UA: 6.5 (ref 5–8)
PROTEIN UA: NEGATIVE
RBC UA: NORMAL /HPF (ref 0–4)
RENAL EPITHELIAL, UA: NORMAL /HPF
SPECIFIC GRAVITY UA: 1.01 (ref 1–1.03)
TRICHOMONAS: NORMAL
TURBIDITY: CLEAR
URINE HGB: NEGATIVE
UROBILINOGEN, URINE: NORMAL
WBC UA: NORMAL /HPF (ref 0–5)
YEAST: NORMAL

## 2020-01-08 LAB
CULTURE: NORMAL
Lab: NORMAL
SPECIMEN DESCRIPTION: NORMAL

## 2020-08-14 NOTE — DISCHARGE SUMMARY
Liamaline [address]                [address] [place] [address], [address] [place] [address]                              DISCHARGE SUMMARY    PATIENT NAME: Stacia Mujica                 :         1970  MED REC NO:   [id number]                             ROOM:       0203  ACCOUNT NO:   [de-identified]                           ADMIT DATE:  2017  PROVIDER:     Reba Briones                  DISCHARGE DATE:  2017    ATTENDING PHYSICIAN OF HOSPITALIZATION:  Shaun Stewart MD    PERSONAL PHYSICIAN:  Dr. Magalis Jiménez, Family Practice    The patient seen by [facility], [facility]. DIAGNOSES:  1. Chest pain, 2017, MI ruled out; 2017, chest x-ray;  2017, no acute cardiopulmonary disease, small hiatal hernia; 2D  echo, 2017, normal left ventricular systolic function, normal  right ventricular systolic function, all valve structures appear  normal, trivial MR, TR, PI, RVSP 22 mmHg; LVEF approximately 70%. EKG, 2017, sinus bradycardia, rate 52, non-significant Q waves  II, III, aVF. 2.  Hypothyroidism, severe; , noncompliant, Synthroid not  taken; homeopathic and herbals were taken. 3.  GERD, hiatal hernia. 4.  Asthma. 5.  Psychiatric bipolar disorder type 1, controlled. 6.  Depression, controlled. 7.  Obesity. 8.  Tobacco abuse, current, daily, advised to quit absolutely  completely. Other medical problems set forth in the progress note of 2017  incorporated for reference herein. HISTORY OF PRESENT ILLNESS AND HOSPITAL COURSE:  The patient is a  49-year-old white female, patient of Dr. Magalis Jiménez, presented with  chest pain. The patient ruled out for myocardial infarction. Other  studies are set forth above. The patient is seen by [facility], [facility], Dr. Olivia Hutchison, who recommended  that the patient undergo outpatient stress testing.     The patient was Please review  I called Lindy Barajas back yesterday and left a message explaining to him that we never received the records request sheet and for him to verify how far does he need in regards to the progress notes and labs  So when calls back, you're not caught off guard  previously on 325 mcg of Synthroid. She has not taken  this medication for some time, apparently had been taking some  homeopathic remedies together with some herbal supplements, which was to  purportedly support her thyroid health, obviously did not work. She advised  to stop all these medications and take her Synthroid as prescribed. She will require TSH in approximately six weeks to gauge her response  to this medication, her previous home dose. GERD and hiatal hernia are fairly quiescent. Underlying history of  asthma, likewise quiescent. The patient is a smoker, encouraged to  quit absolutely completely. Nicotine patch offered. LABORATORY DATA:  Around the time of discharge, white cell count 8.3,  hemoglobin 11.8, hematocrit 35.0, and platelets 171,416. Sodium 140,  potassium 4.1, chloride 105, CO2 of 23, BUN 12, creatinine of 0.9,  glucose 85, calcium 8.8, GFR greater than 60. Troponin less than  0.03, all studies. The patient having ruled out for myocardial  infarction. The patient's free T4 was 0.11.  TSH markedly elevated at  132. DISCHARGE INSTRUCTIONS:  Followup:  Discharged to home on 11/06/2017. Diet:  Cardiac. Activity:  As tolerated. MEDICATIONS:  New:  1. Aspirin 81 mg p.o. daily. 2.  Nicotine 21 mg per 24-hour patch topical, change daily. FOLLOWING MEDICATIONS WITH CHANGES:  Flexeril (cyclobenzaprine) 10 mg  p.o. three times a day p.r.n. muscle spasm. FOLLOWING MEDICATIONS, CONTINUED, NO CHANGE:  1.  Elavil (amitriptyline) 50 mg p.o. q.h.s.  2.  Lipitor (atorvastatin) 40 mg p.o. daily. 3.  Zyrtec (cetirizine) 10 mg p.o. daily. 4.  Cromolyn 4% ophthalmic solution one drop each eye four times a  day. 5.  Pepcid (famotidine) 20 mg p.o. daily. 6.  Ferrous sulfate 325 mg three times a day with meals. 7.  Flovent  mcg canister two puffs twice daily. 8.  Flonase nasal spray (fluticasone) two sprays nasally daily.   9.  Vistaril (hydroxyzine) 50 mg p.o. three times a day p.r.n.  itching. 10.  Levothyroxine combined 300 plus 25 mcg for a total of 325 mcg  (0.325 mg) p.o. daily. 11.  Singulair 10 mg p.o. daily. 12.  Zoloft (sertraline) 50 mg p.o. daily. The patient advised specifically to stop taking naproxen. Note  adjustment in the nicotine patch. Follow up with the patient's  personal physician, Dr. Jimmy Hansen, [facility]. Follow up with  [facility], [facility] for stress  testing. Note, the patient requires at least TSH done in  approximately six weeks to determine the patient's response to the  reinitiated dose of Synthroid. Any aspect of the patient's care not discussed in the chart and/or  dictation will be addressed and treated as an outpatient. The patient's medications have been reviewed including, but not  limited to, pre-hospital, hospital and discharge medications. The  patient and/or the patient's personal representatives were  specifically advised the only medications to be taken are those set  forth in the discharge orders and no other medications should be  taken. Any prior medications not on the discharge orders are  specifically discontinued. The patient has been advised of the financial relationship and  ownership interests between [facility] physicians  and employees of [facility] and [facility].         May Santiago    D: 11/06/2017 15:51:28       T: 11/07/2017 3:30:29     [email or web address]  Job#: [id number]     Doc#: [id number]

## 2020-08-19 ENCOUNTER — HOSPITAL ENCOUNTER (OUTPATIENT)
Age: 50
Setting detail: SPECIMEN
Discharge: HOME | End: 2020-08-19

## 2020-08-19 LAB
ABSOLUTE EOS #: 0.22 K/UL (ref 0–0.44)
ABSOLUTE IMMATURE GRANULOCYTE: 0.09 K/UL (ref 0–0.3)
ABSOLUTE LYMPH #: 1.38 K/UL (ref 1.1–3.7)
ABSOLUTE MONO #: 0.87 K/UL (ref 0.1–1.2)
ALBUMIN SERPL-MCNC: 4.4 G/DL (ref 3.5–5.2)
ALBUMIN/GLOBULIN RATIO: 1.8 (ref 1–2.5)
ALP BLD-CCNC: 92 U/L (ref 35–104)
ALT SERPL-CCNC: 15 U/L (ref 5–33)
ANION GAP SERPL CALCULATED.3IONS-SCNC: 13 MMOL/L (ref 9–17)
AST SERPL-CCNC: 17 U/L
BASOPHILS # BLD: 1 % (ref 0–2)
BASOPHILS ABSOLUTE: 0.09 K/UL (ref 0–0.2)
BILIRUB SERPL-MCNC: <0.1 MG/DL (ref 0.3–1.2)
BUN BLDV-MCNC: 12 MG/DL (ref 6–20)
BUN/CREAT BLD: ABNORMAL (ref 9–20)
CALCIUM SERPL-MCNC: 9.8 MG/DL (ref 8.6–10.4)
CHLORIDE BLD-SCNC: 103 MMOL/L (ref 98–107)
CO2: 23 MMOL/L (ref 20–31)
CREAT SERPL-MCNC: 0.79 MG/DL (ref 0.5–0.9)
DIFFERENTIAL TYPE: ABNORMAL
EOSINOPHILS RELATIVE PERCENT: 2 % (ref 1–4)
GFR AFRICAN AMERICAN: >60 ML/MIN
GFR NON-AFRICAN AMERICAN: >60 ML/MIN
GFR SERPL CREATININE-BSD FRML MDRD: ABNORMAL ML/MIN/{1.73_M2}
GFR SERPL CREATININE-BSD FRML MDRD: ABNORMAL ML/MIN/{1.73_M2}
GLUCOSE BLD-MCNC: 69 MG/DL (ref 70–99)
HCT VFR BLD CALC: 42.3 % (ref 36.3–47.1)
HEMOGLOBIN: 13.3 G/DL (ref 11.9–15.1)
IMMATURE GRANULOCYTES: 1 %
LYMPHOCYTES # BLD: 15 % (ref 24–43)
MCH RBC QN AUTO: 31.4 PG (ref 25.2–33.5)
MCHC RBC AUTO-ENTMCNC: 31.4 G/DL (ref 28.4–34.8)
MCV RBC AUTO: 100 FL (ref 82.6–102.9)
MONOCYTES # BLD: 9 % (ref 3–12)
NRBC AUTOMATED: 0 PER 100 WBC
PDW BLD-RTO: 12.7 % (ref 11.8–14.4)
PLATELET # BLD: 264 K/UL (ref 138–453)
PLATELET ESTIMATE: ABNORMAL
PMV BLD AUTO: 12.5 FL (ref 8.1–13.5)
POTASSIUM SERPL-SCNC: 4.6 MMOL/L (ref 3.7–5.3)
RBC # BLD: 4.23 M/UL (ref 3.95–5.11)
RBC # BLD: ABNORMAL 10*6/UL
SEG NEUTROPHILS: 72 % (ref 36–65)
SEGMENTED NEUTROPHILS ABSOLUTE COUNT: 6.71 K/UL (ref 1.5–8.1)
SODIUM BLD-SCNC: 139 MMOL/L (ref 135–144)
THYROXINE, FREE: 0.8 NG/DL (ref 0.93–1.7)
TOTAL PROTEIN: 6.9 G/DL (ref 6.4–8.3)
TSH SERPL DL<=0.05 MIU/L-ACNC: 16.6 MIU/L (ref 0.3–5)
WBC # BLD: 9.4 K/UL (ref 3.5–11.3)
WBC # BLD: ABNORMAL 10*3/UL

## 2021-01-22 ENCOUNTER — HOSPITAL ENCOUNTER (OUTPATIENT)
Age: 51
Setting detail: SPECIMEN
Discharge: HOME | End: 2021-01-22

## 2021-01-22 LAB — TSH SERPL DL<=0.05 MIU/L-ACNC: 4.08 MIU/L (ref 0.3–5)

## 2021-12-14 ENCOUNTER — HOSPITAL ENCOUNTER (OUTPATIENT)
Age: 51
Setting detail: SPECIMEN
Discharge: HOME | End: 2021-12-14

## 2021-12-14 LAB
HCT VFR BLD CALC: 27 % (ref 36.3–47.1)
HEMOGLOBIN: 7.6 G/DL (ref 11.9–15.1)
MCH RBC QN AUTO: 22.4 PG (ref 25.2–33.5)
MCHC RBC AUTO-ENTMCNC: 28.1 G/DL (ref 28.4–34.8)
MCV RBC AUTO: 79.6 FL (ref 82.6–102.9)
NRBC AUTOMATED: 0 PER 100 WBC
PDW BLD-RTO: 17.9 % (ref 11.8–14.4)
PLATELET # BLD: 422 K/UL (ref 138–453)
PMV BLD AUTO: 11.3 FL (ref 8.1–13.5)
RBC # BLD: 3.39 M/UL (ref 3.95–5.11)
WBC # BLD: 10.2 K/UL (ref 3.5–11.3)

## 2021-12-15 LAB
ALBUMIN SERPL-MCNC: 3.9 G/DL (ref 3.5–5.2)
ALBUMIN/GLOBULIN RATIO: 1.3 (ref 1–2.5)
ALP BLD-CCNC: 109 U/L (ref 35–104)
ALT SERPL-CCNC: 10 U/L (ref 5–33)
ANION GAP SERPL CALCULATED.3IONS-SCNC: 15 MMOL/L (ref 9–17)
AST SERPL-CCNC: 15 U/L
BILIRUB SERPL-MCNC: <0.1 MG/DL (ref 0.3–1.2)
BUN BLDV-MCNC: 11 MG/DL (ref 6–20)
BUN/CREAT BLD: ABNORMAL (ref 9–20)
CALCIUM SERPL-MCNC: 9.6 MG/DL (ref 8.6–10.4)
CHLORIDE BLD-SCNC: 105 MMOL/L (ref 98–107)
CHOLESTEROL/HDL RATIO: 3.3
CHOLESTEROL: 176 MG/DL
CO2: 19 MMOL/L (ref 20–31)
CREAT SERPL-MCNC: 0.94 MG/DL (ref 0.5–0.9)
GFR AFRICAN AMERICAN: >60 ML/MIN
GFR NON-AFRICAN AMERICAN: >60 ML/MIN
GFR SERPL CREATININE-BSD FRML MDRD: ABNORMAL ML/MIN/{1.73_M2}
GFR SERPL CREATININE-BSD FRML MDRD: ABNORMAL ML/MIN/{1.73_M2}
GLUCOSE BLD-MCNC: 98 MG/DL (ref 70–99)
HDLC SERPL-MCNC: 53 MG/DL
LDL CHOLESTEROL: 102 MG/DL (ref 0–130)
POTASSIUM SERPL-SCNC: 4.5 MMOL/L (ref 3.7–5.3)
SODIUM BLD-SCNC: 139 MMOL/L (ref 135–144)
THYROXINE, FREE: 0.67 NG/DL (ref 0.93–1.7)
TOTAL PROTEIN: 6.8 G/DL (ref 6.4–8.3)
TRIGL SERPL-MCNC: 104 MG/DL
TSH SERPL DL<=0.05 MIU/L-ACNC: 12.17 MIU/L (ref 0.3–5)
VLDLC SERPL CALC-MCNC: NORMAL MG/DL (ref 1–30)

## 2022-03-11 ENCOUNTER — HOSPITAL ENCOUNTER (OUTPATIENT)
Age: 52
Setting detail: SPECIMEN
Discharge: HOME | End: 2022-03-11

## 2022-03-12 LAB
THYROXINE, FREE: 0.91 NG/DL (ref 0.93–1.7)
TSH SERPL DL<=0.05 MIU/L-ACNC: 13.11 MIU/L (ref 0.3–5)

## 2022-05-09 ENCOUNTER — HOSPITAL ENCOUNTER (OUTPATIENT)
Age: 52
Setting detail: SPECIMEN
Discharge: HOME | End: 2022-05-09

## 2022-05-10 LAB
ABSOLUTE EOS #: 0.4 K/UL (ref 0–0.4)
ABSOLUTE IMMATURE GRANULOCYTE: 0.1 K/UL (ref 0–0.3)
ABSOLUTE LYMPH #: 2.83 K/UL (ref 1–4.8)
ABSOLUTE MONO #: 0.91 K/UL (ref 0.1–0.8)
BASOPHILS # BLD: 3 % (ref 0–2)
BASOPHILS ABSOLUTE: 0.3 K/UL (ref 0–0.2)
CELLS COUNTED: 200
EOSINOPHILS RELATIVE PERCENT: 4 % (ref 1–4)
HCT VFR BLD CALC: 26.1 % (ref 36.3–47.1)
HEMOGLOBIN: 6.4 G/DL (ref 11.9–15.1)
IMMATURE GRANULOCYTES: 1 %
LYMPHOCYTES # BLD: 28 % (ref 24–44)
MCH RBC QN AUTO: 18.3 PG (ref 25.2–33.5)
MCHC RBC AUTO-ENTMCNC: 24.5 G/DL (ref 28.4–34.8)
MCV RBC AUTO: 74.8 FL (ref 82.6–102.9)
MONOCYTES # BLD: 9 % (ref 1–7)
MORPHOLOGY: ABNORMAL
NRBC AUTOMATED: 0 PER 100 WBC
PDW BLD-RTO: 20.4 % (ref 11.8–14.4)
PLATELET # BLD: 479 K/UL (ref 138–453)
PMV BLD AUTO: 10.8 FL (ref 8.1–13.5)
RBC # BLD: 3.49 M/UL (ref 3.95–5.11)
SEG NEUTROPHILS: 55 % (ref 36–66)
SEGMENTED NEUTROPHILS ABSOLUTE COUNT: 5.56 K/UL (ref 1.8–7.7)
TSH SERPL DL<=0.05 MIU/L-ACNC: 2.94 UIU/ML (ref 0.3–5)
WBC # BLD: 10.1 K/UL (ref 3.5–11.3)

## 2022-05-31 ENCOUNTER — HOSPITAL ENCOUNTER (OUTPATIENT)
Age: 52
Setting detail: SPECIMEN
Discharge: HOME | End: 2022-05-31

## 2022-06-01 LAB
ABSOLUTE EOS #: 0.37 K/UL (ref 0–0.44)
ABSOLUTE IMMATURE GRANULOCYTE: 0 K/UL (ref 0–0.3)
ABSOLUTE LYMPH #: 2.02 K/UL (ref 1.1–3.7)
ABSOLUTE MONO #: 0.64 K/UL (ref 0.1–1.2)
BASOPHILS # BLD: 1 % (ref 0–2)
BASOPHILS ABSOLUTE: 0.09 K/UL (ref 0–0.2)
EOSINOPHILS RELATIVE PERCENT: 4 % (ref 1–4)
HCT VFR BLD CALC: 35.2 % (ref 36.3–47.1)
HEMOGLOBIN: 9.6 G/DL (ref 11.9–15.1)
IMMATURE GRANULOCYTES: 0 %
LYMPHOCYTES # BLD: 22 % (ref 24–43)
MCH RBC QN AUTO: 22.5 PG (ref 25.2–33.5)
MCHC RBC AUTO-ENTMCNC: 27.3 G/DL (ref 28.4–34.8)
MCV RBC AUTO: 82.4 FL (ref 82.6–102.9)
MONOCYTES # BLD: 7 % (ref 3–12)
MORPHOLOGY: ABNORMAL
MORPHOLOGY: ABNORMAL
NRBC AUTOMATED: 0 PER 100 WBC
PDW BLD-RTO: 25.3 % (ref 11.8–14.4)
PLATELET # BLD: ABNORMAL K/UL (ref 138–453)
PLATELET, FLUORESCENCE: 413 K/UL (ref 138–453)
PLATELET, IMMATURE FRACTION: 2.5 % (ref 1.1–10.3)
RBC # BLD: 4.27 M/UL (ref 3.95–5.11)
RBC # BLD: ABNORMAL 10*6/UL
SEG NEUTROPHILS: 66 % (ref 36–65)
SEGMENTED NEUTROPHILS ABSOLUTE COUNT: 6.08 K/UL (ref 1.5–8.1)
WBC # BLD: 9.2 K/UL (ref 3.5–11.3)

## 2022-08-29 ENCOUNTER — HOSPITAL ENCOUNTER (OUTPATIENT)
Age: 52
Setting detail: SPECIMEN
Discharge: HOME | End: 2022-08-29

## 2022-08-30 LAB
ABSOLUTE EOS #: 0.32 K/UL (ref 0–0.4)
ABSOLUTE IMMATURE GRANULOCYTE: 0.32 K/UL (ref 0–0.3)
ABSOLUTE LYMPH #: 2.88 K/UL (ref 1–4.8)
ABSOLUTE MONO #: 0.96 K/UL (ref 0.1–0.8)
BASOPHILS # BLD: 2 % (ref 0–2)
BASOPHILS ABSOLUTE: 0.32 K/UL (ref 0–0.2)
EOSINOPHILS RELATIVE PERCENT: 2 % (ref 1–4)
HCT VFR BLD CALC: 36.3 % (ref 36.3–47.1)
HEMOGLOBIN: 10.2 G/DL (ref 11.9–15.1)
IMMATURE GRANULOCYTES: 2 %
LYMPHOCYTES # BLD: 18 % (ref 24–44)
MCH RBC QN AUTO: 24 PG (ref 25.2–33.5)
MCHC RBC AUTO-ENTMCNC: 28.1 G/DL (ref 28.4–34.8)
MCV RBC AUTO: 85.4 FL (ref 82.6–102.9)
MONOCYTES # BLD: 6 % (ref 1–7)
MORPHOLOGY: ABNORMAL
NRBC AUTOMATED: 0 PER 100 WBC
PDW BLD-RTO: 17.8 % (ref 11.8–14.4)
PLATELET # BLD: 405 K/UL (ref 138–453)
PMV BLD AUTO: 12.6 FL (ref 8.1–13.5)
RBC # BLD: 4.25 M/UL (ref 3.95–5.11)
SEG NEUTROPHILS: 70 % (ref 36–66)
SEGMENTED NEUTROPHILS ABSOLUTE COUNT: 11.2 K/UL (ref 1.8–7.7)
WBC # BLD: 16 K/UL (ref 3.5–11.3)

## 2022-09-06 ENCOUNTER — HOSPITAL ENCOUNTER (OUTPATIENT)
Age: 52
Setting detail: SPECIMEN
Discharge: HOME | End: 2022-09-06

## 2022-09-07 LAB
ABSOLUTE EOS #: 0.27 K/UL (ref 0–0.44)
ABSOLUTE IMMATURE GRANULOCYTE: 0.13 K/UL (ref 0–0.3)
ABSOLUTE LYMPH #: 1.89 K/UL (ref 1.1–3.7)
ABSOLUTE MONO #: 1.03 K/UL (ref 0.1–1.2)
ABSOLUTE RETIC #: 0.06 M/UL (ref 0.03–0.08)
BASOPHILS # BLD: 1 % (ref 0–2)
BASOPHILS ABSOLUTE: 0.11 K/UL (ref 0–0.2)
EOSINOPHILS RELATIVE PERCENT: 2 % (ref 1–4)
FERRITIN: 10 NG/ML (ref 13–150)
HCT VFR BLD CALC: 36 % (ref 36.3–47.1)
HEMOGLOBIN: 10.5 G/DL (ref 11.9–15.1)
IMMATURE GRANULOCYTES: 1 %
IMMATURE RETIC FRACT: 30.9 % (ref 2.7–18.3)
IRON SATURATION: 4 % (ref 20–55)
IRON: 19 UG/DL (ref 37–145)
LYMPHOCYTES # BLD: 17 % (ref 24–43)
MCH RBC QN AUTO: 24.8 PG (ref 25.2–33.5)
MCHC RBC AUTO-ENTMCNC: 29.2 G/DL (ref 28.4–34.8)
MCV RBC AUTO: 84.9 FL (ref 82.6–102.9)
MONOCYTES # BLD: 9 % (ref 3–12)
NRBC AUTOMATED: 0 PER 100 WBC
PDW BLD-RTO: 17.8 % (ref 11.8–14.4)
PLATELET # BLD: 353 K/UL (ref 138–453)
PMV BLD AUTO: 11.8 FL (ref 8.1–13.5)
RBC # BLD: 4.24 M/UL (ref 3.95–5.11)
RBC # BLD: ABNORMAL 10*6/UL
RETIC %: 1.4 % (ref 0.5–1.9)
RETIC HEMOGLOBIN: 27.3 PG (ref 28.2–35.7)
SEG NEUTROPHILS: 70 % (ref 36–65)
SEGMENTED NEUTROPHILS ABSOLUTE COUNT: 7.81 K/UL (ref 1.5–8.1)
THYROXINE, FREE: 0.99 NG/DL (ref 0.93–1.7)
TOTAL IRON BINDING CAPACITY: 432 UG/DL (ref 250–450)
TSH SERPL DL<=0.05 MIU/L-ACNC: 12.42 UIU/ML (ref 0.3–5)
UNSATURATED IRON BINDING CAPACITY: 413 UG/DL (ref 112–347)
WBC # BLD: 11.2 K/UL (ref 3.5–11.3)

## 2022-10-25 ENCOUNTER — HOSPITAL ENCOUNTER (OUTPATIENT)
Age: 52
Setting detail: SPECIMEN
Discharge: HOME | End: 2022-10-25

## 2022-10-26 LAB
ABSOLUTE EOS #: 0.31 K/UL (ref 0–0.44)
ABSOLUTE IMMATURE GRANULOCYTE: 0.42 K/UL (ref 0–0.3)
ABSOLUTE LYMPH #: 1.77 K/UL (ref 1.1–3.7)
ABSOLUTE MONO #: 0.83 K/UL (ref 0.1–1.2)
BASOPHILS # BLD: 1 % (ref 0–2)
BASOPHILS ABSOLUTE: 0.1 K/UL (ref 0–0.2)
EOSINOPHILS RELATIVE PERCENT: 3 % (ref 1–4)
FERRITIN: 334 NG/ML (ref 13–150)
HCT VFR BLD CALC: 38.9 % (ref 36.3–47.1)
HEMOGLOBIN: 11.5 G/DL (ref 11.9–15.1)
IMMATURE GRANULOCYTES: 4 %
LYMPHOCYTES # BLD: 17 % (ref 24–43)
MCH RBC QN AUTO: 26.6 PG (ref 25.2–33.5)
MCHC RBC AUTO-ENTMCNC: 29.6 G/DL (ref 28.4–34.8)
MCV RBC AUTO: 90 FL (ref 82.6–102.9)
MONOCYTES # BLD: 8 % (ref 3–12)
MORPHOLOGY: ABNORMAL
NRBC AUTOMATED: 0 PER 100 WBC
PDW BLD-RTO: 22.8 % (ref 11.8–14.4)
PLATELET # BLD: 387 K/UL (ref 138–453)
PMV BLD AUTO: 11.8 FL (ref 8.1–13.5)
RBC # BLD: 4.32 M/UL (ref 3.95–5.11)
SEG NEUTROPHILS: 67 % (ref 36–65)
SEGMENTED NEUTROPHILS ABSOLUTE COUNT: 6.97 K/UL (ref 1.5–8.1)
TSH SERPL DL<=0.05 MIU/L-ACNC: 1.51 UIU/ML (ref 0.3–5)
WBC # BLD: 10.4 K/UL (ref 3.5–11.3)

## 2022-11-28 ENCOUNTER — HOSPITAL ENCOUNTER (OUTPATIENT)
Age: 52
Setting detail: SPECIMEN
Discharge: HOME | End: 2022-11-28

## 2022-11-29 LAB
THYROXINE, FREE: 1.01 NG/DL (ref 0.93–1.7)
TSH SERPL DL<=0.05 MIU/L-ACNC: 2.89 UIU/ML (ref 0.3–5)

## 2024-08-07 NOTE — PLAN OF CARE
Problem: Falls - Risk of  Goal: Absence of falls  Outcome: Ongoing      Problem: Pain:  Goal: Pain level will decrease  Pain level will decrease  Outcome: Ongoing    Goal: Control of acute pain  Control of acute pain  Outcome: Ongoing    Goal: Control of chronic pain  Control of chronic pain  Outcome: Ongoing
Yes